# Patient Record
Sex: MALE | Race: OTHER | HISPANIC OR LATINO | URBAN - METROPOLITAN AREA
[De-identification: names, ages, dates, MRNs, and addresses within clinical notes are randomized per-mention and may not be internally consistent; named-entity substitution may affect disease eponyms.]

---

## 2019-03-29 ENCOUNTER — OUTPATIENT (OUTPATIENT)
Dept: OUTPATIENT SERVICES | Facility: HOSPITAL | Age: 64
LOS: 1 days | End: 2019-03-29
Payer: COMMERCIAL

## 2019-03-29 DIAGNOSIS — R01.1 CARDIAC MURMUR, UNSPECIFIED: ICD-10-CM

## 2019-03-29 DIAGNOSIS — I10 ESSENTIAL (PRIMARY) HYPERTENSION: ICD-10-CM

## 2019-03-29 PROBLEM — Z00.00 ENCOUNTER FOR PREVENTIVE HEALTH EXAMINATION: Status: ACTIVE | Noted: 2019-03-29

## 2019-03-29 PROCEDURE — 93306 TTE W/DOPPLER COMPLETE: CPT

## 2019-03-29 PROCEDURE — 93306 TTE W/DOPPLER COMPLETE: CPT | Mod: 26

## 2019-05-17 VITALS
SYSTOLIC BLOOD PRESSURE: 150 MMHG | TEMPERATURE: 98 F | OXYGEN SATURATION: 99 % | HEART RATE: 55 BPM | DIASTOLIC BLOOD PRESSURE: 70 MMHG | RESPIRATION RATE: 16 BRPM

## 2019-05-17 RX ORDER — CHLORHEXIDINE GLUCONATE 213 G/1000ML
1 SOLUTION TOPICAL ONCE
Refills: 0 | Status: DISCONTINUED | OUTPATIENT
Start: 2019-05-20 | End: 2019-05-21

## 2019-05-17 NOTE — H&P ADULT - NSICDXFAMILYHX_GEN_ALL_CORE_FT
No pertinent family history in first degree relatives FAMILY HISTORY:  FH: breast cancer, Sister  FH: CHF (congestive heart failure)  FH: diabetes mellitus, Mother  FH: hypertension, Mother and Father

## 2019-05-17 NOTE — H&P ADULT - RS GEN PE MLT RESP DETAILS PC
breath sounds equal/good air movement/no chest wall tenderness/normal/no rhonchi/airway patent/clear to auscultation bilaterally/no intercostal retractions/no rales/respirations non-labored/no subcutaneous emphysema

## 2019-05-17 NOTE — H&P ADULT - ASSESSMENT
65 yo British/English speaking male, POOR HISTORIAN with PMHx of HTN, HLD, DM, CAD s/p PCI in 2013 in  (told he had multivessel disease) who is referred for cardiac catheterization with possible intervention to r/o underlying CAD 2/2 pt's risk factors, abnormal EST, CCS Class IV Anginal Sx.     H/H . Pt denies bleeding, GI bleeding, hematemesis, hematuria, BRBPR or melena . ASA … and Plavix … pre-cath.  Cr. IV NS@ cc/hr pre-cath.  Risks & benefits of procedure and alternative therapy have been explained to the patient including but not limited to: allergic reaction, bleeding w/possible need for blood transfusion, infection, renal and vascular compromise, limb damage, arrhythmia, stroke, vessel dissection/perforation, Myocardial infarction, emergent CABG. Informed consent obtained and in chart 65 yo British/English speaking male, POOR HISTORIAN with PMHx of HTN, HLD, DM, CAD s/p PCI in 2013 in  (told he had multivessel disease) who is referred for cardiac catheterization with possible intervention to r/o underlying CAD 2/2 pt's risk factors, abnormal EST, CCS Class IV Anginal Sx.     H/H 14.9/45.9. Pt denies bleeding, GI bleeding, hematemesis, hematuria, BRBPR or melena . Pt. reports being compliant with home DAPT. Pt. given ASA 81 mg PO X 1 and Plavix 75 mg PO X 1 pre-cath.  Cr.  pt. euvolemic in exam. IV NS@ 75 cc/hr pre-cath.  Risks & benefits of procedure and alternative therapy have been explained to the patient including but not limited to: allergic reaction, bleeding w/possible need for blood transfusion, infection, renal and vascular compromise, limb damage, arrhythmia, stroke, vessel dissection/perforation, Myocardial infarction, emergent CABG. Informed consent obtained and in chart

## 2019-05-17 NOTE — H&P ADULT - NSHPSOCIALHISTORY_GEN_ALL_CORE
Denies tobacco use/elicit drug use  Used to drink moderately but stopped 15 years ago and now drinks socially

## 2019-05-17 NOTE — H&P ADULT - HISTORY OF PRESENT ILLNESS
**VERIFY MEDICATIONS    63 yo Moldovan/English speaking male, POOR HISTORIAN with PMHx of HTN, HLD, DM, CAD s/p PCI in 2013 in  who presented to Cardiologist Dr. Wright with c/o intermittent, non-radiating RSCP described of "simple pain" and of mild intensity, lasting few seconds before self-resolving since last stent in 2013.  Per MD note: ARMSTRONG and fatigue after climbing 2 flights of stairs but patient reports he is at his baseline exercise tolerance and able to climb 10 flights of stairs without stopping. Pt also endorses L lateral pain between knee and ankle described as discomfort upon ambulation of few city blocks, resolving with rest, over many years.  Denies SOB, dizziness, diaphoresis, palpitations, LE edema, orthopnea, PND, syncope, N/V, abdominal pain. In light of pt's risk factors, abnormal NST, CCS Class IV Anginal Sx, pt referred for cardiac catheterization with possible intervention to r/o underlying CAD. **VERIFY MEDICATIONS    63 yo Mauritian/English speaking male, POOR HISTORIAN with PMHx of HTN, HLD, DM, CAD s/p PCI in 2013 in  (told he had multivessel disease) who presented to Cardiologist Dr. Wright with c/o intermittent, non-radiating RSCP described of "simple pain" and of mild intensity, lasting few seconds before self-resolving since last stent in 2013.  Per MD note: ARMSTRONG and fatigue after climbing 2 flights of stairs but patient reports he is at his baseline exercise tolerance and able to climb 10 flights of stairs without stopping. Pt also endorses L lateral pain between knee and ankle described as discomfort upon ambulation of few city blocks, resolving with rest, over many years.  Denies SOB, dizziness, diaphoresis, palpitations, LE edema, orthopnea, PND, syncope, N/V, abdominal pain, non-healing wounds, change in color/temperature/hair in LE. EST/nuclear perfusion 4/8/19: at peak exercise, pt developed 1 mm horizontal ST segment changes c/w ischemia in inferior leads+ V6, mild anterior wall ischemia LVEF 57% In light of pt's risk factors, abnormal NST, CCS Class IV Anginal Sx, pt referred for cardiac catheterization with possible intervention to r/o underlying CAD. **VERIFY MEDICATIONS    63 yo Grenadian/English speaking male, POOR HISTORIAN with PMHx of HTN, HLD, DM, CAD s/p PCI in 2013 in  (told he had multivessel disease) who presented to Cardiologist Dr. Wright with c/o intermittent, non-radiating RSCP described of "simple pain" and of mild intensity, lasting few seconds before self-resolving since last stent in 2013.  Per MD note: ARMSTRONG and fatigue after climbing 2 flights of stairs but patient reports he is at his baseline exercise tolerance and able to climb 10 flights of stairs without stopping. Pt also endorses L lateral pain between knee and ankle described as discomfort upon ambulation of few city blocks, resolving with rest, over many years.  Denies SOB, dizziness, diaphoresis, palpitations, LE edema, orthopnea, PND, syncope, N/V, abdominal pain, non-healing wounds, change in color/temperature/hair in LE. EST/nuclear perfusion 4/8/19: at peak exercise, pt developed 1 mm horizontal ST segment changes c/w ischemia in inferior leads+ V6, mild anterior wall ischemia LVEF 57% In light of pt's risk factors, abnormal EST, CCS Class IV Anginal Sx, pt referred for cardiac catheterization with possible intervention to r/o underlying CAD. 63 yo Zimbabwean/English speaking male, POOR HISTORIAN with PMHx of HTN, HLD, DM, CAD s/p PCI in 2013 in  (told he had multivessel disease) who presented to Cardiologist Dr. Wright with c/o intermittent, non-radiating RSCP described of "simple pain" and of mild intensity, lasting few seconds before self-resolving since last stent in 2013.  Per MD note: ARMSTRONG and fatigue after climbing 2 flights of stairs but patient reports he is at his baseline exercise tolerance and able to climb 10 flights of stairs without stopping. Pt also endorses L lateral pain between knee and ankle described as discomfort upon ambulation of few city blocks, resolving with rest, over many years.  Denies SOB, dizziness, diaphoresis, palpitations, LE edema, orthopnea, PND, syncope, N/V, abdominal pain, non-healing wounds, change in color/temperature/hair in LE. EST/nuclear perfusion 4/8/19: at peak exercise, pt developed 1 mm horizontal ST segment changes c/w ischemia in inferior leads+ V6, mild anterior wall ischemia LVEF 57% In light of pt's risk factors, abnormal EST, CCS Class IV Anginal Sx, pt referred for cardiac catheterization with possible intervention to r/o underlying CAD.

## 2019-05-17 NOTE — H&P ADULT - NSHPLABSRESULTS_GEN_ALL_CORE
14.9   6.80  )-----------( 180      ( 20 May 2019 07:40 )             45.9   PT/INR - ( 20 May 2019 07:40 )   PT: 11.5 sec;   INR: 1.02          PTT - ( 20 May 2019 07:40 )  PTT:28.3 sec

## 2019-05-17 NOTE — H&P ADULT - NSICDXPASTMEDICALHX_GEN_ALL_CORE_FT
PAST MEDICAL HISTORY:  Coronary artery disease     DM (diabetes mellitus)     Hyperlipidemia     Hypertension

## 2019-05-20 ENCOUNTER — INPATIENT (INPATIENT)
Facility: HOSPITAL | Age: 64
LOS: 0 days | Discharge: HOME | DRG: 246 | End: 2019-05-21
Attending: INTERNAL MEDICINE | Admitting: INTERNAL MEDICINE
Payer: COMMERCIAL

## 2019-05-20 DIAGNOSIS — Z95.5 PRESENCE OF CORONARY ANGIOPLASTY IMPLANT AND GRAFT: Chronic | ICD-10-CM

## 2019-05-20 LAB
ALBUMIN SERPL ELPH-MCNC: 4.4 G/DL — SIGNIFICANT CHANGE UP (ref 3.3–5)
ALP SERPL-CCNC: 60 U/L — SIGNIFICANT CHANGE UP (ref 40–120)
ALT FLD-CCNC: 21 U/L — SIGNIFICANT CHANGE UP (ref 10–45)
ANION GAP SERPL CALC-SCNC: 11 MMOL/L — SIGNIFICANT CHANGE UP (ref 5–17)
APTT BLD: 28.3 SEC — SIGNIFICANT CHANGE UP (ref 27.5–36.3)
AST SERPL-CCNC: 19 U/L — SIGNIFICANT CHANGE UP (ref 10–40)
BASOPHILS # BLD AUTO: 0.05 K/UL — SIGNIFICANT CHANGE UP (ref 0–0.2)
BASOPHILS NFR BLD AUTO: 0.7 % — SIGNIFICANT CHANGE UP (ref 0–2)
BILIRUB SERPL-MCNC: 0.7 MG/DL — SIGNIFICANT CHANGE UP (ref 0.2–1.2)
BUN SERPL-MCNC: 15 MG/DL — SIGNIFICANT CHANGE UP (ref 7–23)
CALCIUM SERPL-MCNC: 9.6 MG/DL — SIGNIFICANT CHANGE UP (ref 8.4–10.5)
CHLORIDE SERPL-SCNC: 97 MMOL/L — SIGNIFICANT CHANGE UP (ref 96–108)
CHOLEST SERPL-MCNC: 128 MG/DL — SIGNIFICANT CHANGE UP (ref 10–199)
CO2 SERPL-SCNC: 29 MMOL/L — SIGNIFICANT CHANGE UP (ref 22–31)
CREAT SERPL-MCNC: 1 MG/DL — SIGNIFICANT CHANGE UP (ref 0.5–1.3)
CRP SERPL-MCNC: 0.08 MG/DL — SIGNIFICANT CHANGE UP (ref 0–0.4)
EOSINOPHIL # BLD AUTO: 0.31 K/UL — SIGNIFICANT CHANGE UP (ref 0–0.5)
EOSINOPHIL NFR BLD AUTO: 4.6 % — SIGNIFICANT CHANGE UP (ref 0–6)
GLUCOSE SERPL-MCNC: 191 MG/DL — HIGH (ref 70–99)
HBA1C BLD-MCNC: 8.9 % — HIGH (ref 4–5.6)
HCT VFR BLD CALC: 45.9 % — SIGNIFICANT CHANGE UP (ref 39–50)
HDLC SERPL-MCNC: 40 MG/DL — SIGNIFICANT CHANGE UP
HGB BLD-MCNC: 14.9 G/DL — SIGNIFICANT CHANGE UP (ref 13–17)
IMM GRANULOCYTES NFR BLD AUTO: 0.3 % — SIGNIFICANT CHANGE UP (ref 0–1.5)
INR BLD: 1.02 — SIGNIFICANT CHANGE UP (ref 0.88–1.16)
LIPID PNL WITH DIRECT LDL SERPL: 65 MG/DL — SIGNIFICANT CHANGE UP
LYMPHOCYTES # BLD AUTO: 3.15 K/UL — SIGNIFICANT CHANGE UP (ref 1–3.3)
LYMPHOCYTES # BLD AUTO: 46.3 % — HIGH (ref 13–44)
MCHC RBC-ENTMCNC: 30 PG — SIGNIFICANT CHANGE UP (ref 27–34)
MCHC RBC-ENTMCNC: 32.5 GM/DL — SIGNIFICANT CHANGE UP (ref 32–36)
MCV RBC AUTO: 92.5 FL — SIGNIFICANT CHANGE UP (ref 80–100)
MONOCYTES # BLD AUTO: 0.58 K/UL — SIGNIFICANT CHANGE UP (ref 0–0.9)
MONOCYTES NFR BLD AUTO: 8.5 % — SIGNIFICANT CHANGE UP (ref 2–14)
NEUTROPHILS # BLD AUTO: 2.69 K/UL — SIGNIFICANT CHANGE UP (ref 1.8–7.4)
NEUTROPHILS NFR BLD AUTO: 39.6 % — LOW (ref 43–77)
NRBC # BLD: 0 /100 WBCS — SIGNIFICANT CHANGE UP (ref 0–0)
PLATELET # BLD AUTO: 180 K/UL — SIGNIFICANT CHANGE UP (ref 150–400)
POTASSIUM SERPL-MCNC: 4.2 MMOL/L — SIGNIFICANT CHANGE UP (ref 3.5–5.3)
POTASSIUM SERPL-SCNC: 4.2 MMOL/L — SIGNIFICANT CHANGE UP (ref 3.5–5.3)
PROT SERPL-MCNC: 7.2 G/DL — SIGNIFICANT CHANGE UP (ref 6–8.3)
PROTHROM AB SERPL-ACNC: 11.5 SEC — SIGNIFICANT CHANGE UP (ref 10–12.9)
RBC # BLD: 4.96 M/UL — SIGNIFICANT CHANGE UP (ref 4.2–5.8)
RBC # FLD: 13.4 % — SIGNIFICANT CHANGE UP (ref 10.3–14.5)
SODIUM SERPL-SCNC: 137 MMOL/L — SIGNIFICANT CHANGE UP (ref 135–145)
TOTAL CHOLESTEROL/HDL RATIO MEASUREMENT: 3.2 RATIO — LOW (ref 3.4–9.6)
TRIGL SERPL-MCNC: 114 MG/DL — SIGNIFICANT CHANGE UP (ref 10–149)
WBC # BLD: 6.8 K/UL — SIGNIFICANT CHANGE UP (ref 3.8–10.5)
WBC # FLD AUTO: 6.8 K/UL — SIGNIFICANT CHANGE UP (ref 3.8–10.5)

## 2019-05-20 PROCEDURE — 93458 L HRT ARTERY/VENTRICLE ANGIO: CPT | Mod: 26,59

## 2019-05-20 PROCEDURE — 93571 IV DOP VEL&/PRESS C FLO 1ST: CPT | Mod: 26

## 2019-05-20 PROCEDURE — 93010 ELECTROCARDIOGRAM REPORT: CPT

## 2019-05-20 PROCEDURE — 92929: CPT | Mod: RC

## 2019-05-20 PROCEDURE — 92928 PRQ TCAT PLMT NTRAC ST 1 LES: CPT | Mod: RC

## 2019-05-20 RX ORDER — HYDROCHLOROTHIAZIDE 25 MG
12.5 TABLET ORAL DAILY
Refills: 0 | Status: DISCONTINUED | OUTPATIENT
Start: 2019-05-21 | End: 2019-05-21

## 2019-05-20 RX ORDER — DEXTROSE 50 % IN WATER 50 %
25 SYRINGE (ML) INTRAVENOUS ONCE
Refills: 0 | Status: DISCONTINUED | OUTPATIENT
Start: 2019-05-20 | End: 2019-05-21

## 2019-05-20 RX ORDER — INSULIN LISPRO 100/ML
VIAL (ML) SUBCUTANEOUS ONCE
Refills: 0 | Status: DISCONTINUED | OUTPATIENT
Start: 2019-05-20 | End: 2019-05-20

## 2019-05-20 RX ORDER — ASPIRIN/CALCIUM CARB/MAGNESIUM 324 MG
81 TABLET ORAL ONCE
Refills: 0 | Status: COMPLETED | OUTPATIENT
Start: 2019-05-20 | End: 2019-05-20

## 2019-05-20 RX ORDER — INSULIN LISPRO 100/ML
VIAL (ML) SUBCUTANEOUS
Refills: 0 | Status: DISCONTINUED | OUTPATIENT
Start: 2019-05-20 | End: 2019-05-21

## 2019-05-20 RX ORDER — METOPROLOL TARTRATE 50 MG
25 TABLET ORAL DAILY
Refills: 0 | Status: DISCONTINUED | OUTPATIENT
Start: 2019-05-20 | End: 2019-05-21

## 2019-05-20 RX ORDER — DEXTROSE 50 % IN WATER 50 %
25 SYRINGE (ML) INTRAVENOUS ONCE
Refills: 0 | Status: DISCONTINUED | OUTPATIENT
Start: 2019-05-20 | End: 2019-05-20

## 2019-05-20 RX ORDER — GLUCAGON INJECTION, SOLUTION 0.5 MG/.1ML
1 INJECTION, SOLUTION SUBCUTANEOUS ONCE
Refills: 0 | Status: DISCONTINUED | OUTPATIENT
Start: 2019-05-20 | End: 2019-05-20

## 2019-05-20 RX ORDER — LISINOPRIL 2.5 MG/1
20 TABLET ORAL DAILY
Refills: 0 | Status: DISCONTINUED | OUTPATIENT
Start: 2019-05-21 | End: 2019-05-21

## 2019-05-20 RX ORDER — AMLODIPINE BESYLATE 2.5 MG/1
5 TABLET ORAL DAILY
Refills: 0 | Status: DISCONTINUED | OUTPATIENT
Start: 2019-05-20 | End: 2019-05-21

## 2019-05-20 RX ORDER — SODIUM CHLORIDE 9 MG/ML
1000 INJECTION, SOLUTION INTRAVENOUS
Refills: 0 | Status: DISCONTINUED | OUTPATIENT
Start: 2019-05-20 | End: 2019-05-21

## 2019-05-20 RX ORDER — DEXTROSE 50 % IN WATER 50 %
15 SYRINGE (ML) INTRAVENOUS ONCE
Refills: 0 | Status: DISCONTINUED | OUTPATIENT
Start: 2019-05-20 | End: 2019-05-20

## 2019-05-20 RX ORDER — GLUCAGON INJECTION, SOLUTION 0.5 MG/.1ML
1 INJECTION, SOLUTION SUBCUTANEOUS ONCE
Refills: 0 | Status: DISCONTINUED | OUTPATIENT
Start: 2019-05-20 | End: 2019-05-21

## 2019-05-20 RX ORDER — SODIUM CHLORIDE 9 MG/ML
500 INJECTION INTRAMUSCULAR; INTRAVENOUS; SUBCUTANEOUS
Refills: 0 | Status: DISCONTINUED | OUTPATIENT
Start: 2019-05-20 | End: 2019-05-21

## 2019-05-20 RX ORDER — SODIUM CHLORIDE 9 MG/ML
1000 INJECTION, SOLUTION INTRAVENOUS
Refills: 0 | Status: DISCONTINUED | OUTPATIENT
Start: 2019-05-20 | End: 2019-05-20

## 2019-05-20 RX ORDER — DEXTROSE 50 % IN WATER 50 %
12.5 SYRINGE (ML) INTRAVENOUS ONCE
Refills: 0 | Status: DISCONTINUED | OUTPATIENT
Start: 2019-05-20 | End: 2019-05-21

## 2019-05-20 RX ORDER — DEXTROSE 50 % IN WATER 50 %
12.5 SYRINGE (ML) INTRAVENOUS ONCE
Refills: 0 | Status: DISCONTINUED | OUTPATIENT
Start: 2019-05-20 | End: 2019-05-20

## 2019-05-20 RX ORDER — SODIUM CHLORIDE 9 MG/ML
500 INJECTION INTRAMUSCULAR; INTRAVENOUS; SUBCUTANEOUS
Refills: 0 | Status: DISCONTINUED | OUTPATIENT
Start: 2019-05-20 | End: 2019-05-20

## 2019-05-20 RX ORDER — DEXTROSE 50 % IN WATER 50 %
15 SYRINGE (ML) INTRAVENOUS ONCE
Refills: 0 | Status: DISCONTINUED | OUTPATIENT
Start: 2019-05-20 | End: 2019-05-21

## 2019-05-20 RX ORDER — CLOPIDOGREL BISULFATE 75 MG/1
75 TABLET, FILM COATED ORAL ONCE
Refills: 0 | Status: COMPLETED | OUTPATIENT
Start: 2019-05-20 | End: 2019-05-20

## 2019-05-20 RX ORDER — ASPIRIN/CALCIUM CARB/MAGNESIUM 324 MG
81 TABLET ORAL DAILY
Refills: 0 | Status: DISCONTINUED | OUTPATIENT
Start: 2019-05-21 | End: 2019-05-21

## 2019-05-20 RX ORDER — CLOPIDOGREL BISULFATE 75 MG/1
75 TABLET, FILM COATED ORAL DAILY
Refills: 0 | Status: DISCONTINUED | OUTPATIENT
Start: 2019-05-21 | End: 2019-05-21

## 2019-05-20 RX ORDER — ATORVASTATIN CALCIUM 80 MG/1
40 TABLET, FILM COATED ORAL AT BEDTIME
Refills: 0 | Status: DISCONTINUED | OUTPATIENT
Start: 2019-05-20 | End: 2019-05-21

## 2019-05-20 RX ADMIN — ATORVASTATIN CALCIUM 40 MILLIGRAM(S): 80 TABLET, FILM COATED ORAL at 22:27

## 2019-05-20 RX ADMIN — Medication 25 MILLIGRAM(S): at 13:40

## 2019-05-20 RX ADMIN — CLOPIDOGREL BISULFATE 75 MILLIGRAM(S): 75 TABLET, FILM COATED ORAL at 08:06

## 2019-05-20 RX ADMIN — Medication 2: at 12:01

## 2019-05-20 RX ADMIN — SODIUM CHLORIDE 75 MILLILITER(S): 9 INJECTION INTRAMUSCULAR; INTRAVENOUS; SUBCUTANEOUS at 08:07

## 2019-05-20 RX ADMIN — AMLODIPINE BESYLATE 5 MILLIGRAM(S): 2.5 TABLET ORAL at 13:40

## 2019-05-20 RX ADMIN — Medication 81 MILLIGRAM(S): at 08:06

## 2019-05-20 NOTE — PATIENT PROFILE ADULT - NSPROHMCARDIOMGMTSTRAT_GEN_A_NUR
adequate rest/routine screening/weight management/diet modification/exercise/medication therapy/fluid modification

## 2019-05-20 NOTE — CHART NOTE - NSCHARTNOTEFT_GEN_A_CORE
Radial Band Removal Note. Radial band removed at 4:30PM  VSS. Pt Asymptomatic.   Pre-Radial Band Removal: R Radial pulse 1+. No hematoma. No bleeding.  Hemostasis achieved with manual release of radial band.  Post Radial Band Removal: R Radial pulse 2+. No hematoma. No bleeding.  Cont. to monitor.  Care instructions given.

## 2019-05-21 ENCOUNTER — TRANSCRIPTION ENCOUNTER (OUTPATIENT)
Age: 64
End: 2019-05-21

## 2019-05-21 VITALS
RESPIRATION RATE: 16 BRPM | HEART RATE: 58 BPM | OXYGEN SATURATION: 98 % | SYSTOLIC BLOOD PRESSURE: 155 MMHG | DIASTOLIC BLOOD PRESSURE: 80 MMHG

## 2019-05-21 LAB
ANION GAP SERPL CALC-SCNC: 14 MMOL/L — SIGNIFICANT CHANGE UP (ref 5–17)
BUN SERPL-MCNC: 13 MG/DL — SIGNIFICANT CHANGE UP (ref 7–23)
CALCIUM SERPL-MCNC: 10 MG/DL — SIGNIFICANT CHANGE UP (ref 8.4–10.5)
CHLORIDE SERPL-SCNC: 100 MMOL/L — SIGNIFICANT CHANGE UP (ref 96–108)
CO2 SERPL-SCNC: 25 MMOL/L — SIGNIFICANT CHANGE UP (ref 22–31)
CREAT SERPL-MCNC: 0.97 MG/DL — SIGNIFICANT CHANGE UP (ref 0.5–1.3)
GLUCOSE SERPL-MCNC: 184 MG/DL — HIGH (ref 70–99)
HBA1C BLD-MCNC: 8.7 % — HIGH (ref 4–5.6)
HCT VFR BLD CALC: 48.8 % — SIGNIFICANT CHANGE UP (ref 39–50)
HCV AB S/CO SERPL IA: 0.04 S/CO — SIGNIFICANT CHANGE UP
HCV AB SERPL-IMP: SIGNIFICANT CHANGE UP
HGB BLD-MCNC: 16.2 G/DL — SIGNIFICANT CHANGE UP (ref 13–17)
MAGNESIUM SERPL-MCNC: 1.6 MG/DL — SIGNIFICANT CHANGE UP (ref 1.6–2.6)
MCHC RBC-ENTMCNC: 30.2 PG — SIGNIFICANT CHANGE UP (ref 27–34)
MCHC RBC-ENTMCNC: 33.2 GM/DL — SIGNIFICANT CHANGE UP (ref 32–36)
MCV RBC AUTO: 90.9 FL — SIGNIFICANT CHANGE UP (ref 80–100)
NRBC # BLD: 0 /100 WBCS — SIGNIFICANT CHANGE UP (ref 0–0)
PLATELET # BLD AUTO: 201 K/UL — SIGNIFICANT CHANGE UP (ref 150–400)
POTASSIUM SERPL-MCNC: 4.1 MMOL/L — SIGNIFICANT CHANGE UP (ref 3.5–5.3)
POTASSIUM SERPL-SCNC: 4.1 MMOL/L — SIGNIFICANT CHANGE UP (ref 3.5–5.3)
RBC # BLD: 5.37 M/UL — SIGNIFICANT CHANGE UP (ref 4.2–5.8)
RBC # FLD: 13 % — SIGNIFICANT CHANGE UP (ref 10.3–14.5)
SODIUM SERPL-SCNC: 139 MMOL/L — SIGNIFICANT CHANGE UP (ref 135–145)
WBC # BLD: 8.67 K/UL — SIGNIFICANT CHANGE UP (ref 3.8–10.5)
WBC # FLD AUTO: 8.67 K/UL — SIGNIFICANT CHANGE UP (ref 3.8–10.5)

## 2019-05-21 PROCEDURE — 99239 HOSP IP/OBS DSCHRG MGMT >30: CPT

## 2019-05-21 PROCEDURE — 99232 SBSQ HOSP IP/OBS MODERATE 35: CPT | Mod: 25

## 2019-05-21 RX ORDER — ASPIRIN/CALCIUM CARB/MAGNESIUM 324 MG
1 TABLET ORAL
Qty: 30 | Refills: 11
Start: 2019-05-21 | End: 2020-05-14

## 2019-05-21 RX ORDER — ASPIRIN/CALCIUM CARB/MAGNESIUM 324 MG
1 TABLET ORAL
Qty: 0 | Refills: 0 | DISCHARGE

## 2019-05-21 RX ORDER — MAGNESIUM SULFATE 500 MG/ML
2 VIAL (ML) INJECTION ONCE
Refills: 0 | Status: COMPLETED | OUTPATIENT
Start: 2019-05-21 | End: 2019-05-21

## 2019-05-21 RX ORDER — AMLODIPINE BESYLATE 2.5 MG/1
5 TABLET ORAL ONCE
Refills: 0 | Status: COMPLETED | OUTPATIENT
Start: 2019-05-21 | End: 2019-05-21

## 2019-05-21 RX ORDER — AMLODIPINE BESYLATE 2.5 MG/1
1 TABLET ORAL
Qty: 0 | Refills: 0 | DISCHARGE

## 2019-05-21 RX ORDER — CLOPIDOGREL BISULFATE 75 MG/1
1 TABLET, FILM COATED ORAL
Qty: 0 | Refills: 0 | DISCHARGE

## 2019-05-21 RX ORDER — CLOPIDOGREL BISULFATE 75 MG/1
1 TABLET, FILM COATED ORAL
Qty: 30 | Refills: 11
Start: 2019-05-21 | End: 2020-05-14

## 2019-05-21 RX ORDER — METFORMIN HYDROCHLORIDE 850 MG/1
1 TABLET ORAL
Qty: 0 | Refills: 0 | DISCHARGE

## 2019-05-21 RX ORDER — AMLODIPINE BESYLATE 2.5 MG/1
1 TABLET ORAL
Qty: 30 | Refills: 0
Start: 2019-05-21 | End: 2019-06-19

## 2019-05-21 RX ORDER — AMLODIPINE BESYLATE 2.5 MG/1
10 TABLET ORAL DAILY
Refills: 0 | Status: DISCONTINUED | OUTPATIENT
Start: 2019-05-22 | End: 2019-05-21

## 2019-05-21 RX ADMIN — Medication 2: at 11:16

## 2019-05-21 RX ADMIN — Medication 25 MILLIGRAM(S): at 05:57

## 2019-05-21 RX ADMIN — Medication 50 GRAM(S): at 10:11

## 2019-05-21 RX ADMIN — Medication 81 MILLIGRAM(S): at 11:16

## 2019-05-21 RX ADMIN — AMLODIPINE BESYLATE 5 MILLIGRAM(S): 2.5 TABLET ORAL at 11:16

## 2019-05-21 RX ADMIN — Medication 2: at 07:13

## 2019-05-21 RX ADMIN — AMLODIPINE BESYLATE 5 MILLIGRAM(S): 2.5 TABLET ORAL at 05:57

## 2019-05-21 RX ADMIN — CLOPIDOGREL BISULFATE 75 MILLIGRAM(S): 75 TABLET, FILM COATED ORAL at 11:16

## 2019-05-21 NOTE — DISCHARGE NOTE NURSING/CASE MANAGEMENT/SOCIAL WORK - NSDCDPATPORTLINK_GEN_ALL_CORE
You can access the [facility] Portal, offered by [facility], by registering with the following website: [email or web address]

## 2019-05-21 NOTE — DISCHARGE NOTE PROVIDER - NSDCCPCAREPLAN_GEN_ALL_CORE_FT
PRINCIPAL DISCHARGE DIAGNOSIS  Diagnosis: CAD (coronary artery disease)  Assessment and Plan of Treatment: CONTINUE ASPIRIN AND PLAVIX (CLOPIDOGREL) DAILY. Continue current listed medical regimen. See Dr. Wright in 1 week. See Primary Doctor in 2 days.  Monitor right wrist/arm and groin/leg for swelling, bleeding, discharge, pain or skin discoloration if any of these findings are noted go to nearest ER, seek medical attention immediately and call [phone] if any questions. If chest pain, shortness of breath, dizziness noted call MD immediately and seek medical attention.  Avoid hot tubs, swimming pools and baths for 1 week. Avoid lifting more than 3 pounds for one week, avoid exertional movements such intimacy, running, jumping, etc for 1 week.  Avoid exertional movements and lifting more than 2 pounds in right/ hand/arm for 1 week.         SECONDARY DISCHARGE DIAGNOSES  Diagnosis: HTN (hypertension)  Assessment and Plan of Treatment: Norvasc (Amlodipine) increased to 10mg daily. Otherwise, continue listed medical regimen. See Dr. Wright in 1 week. See Primary Doctor in 2 days.    Diagnosis: HLD (hyperlipidemia)  Assessment and Plan of Treatment: Continue current listed medical regimen. MD follow-up    Diagnosis: Diabetes  Assessment and Plan of Treatment: HOLD METFORMIN AND RESUME ON 5/23/19. Continue Glimeperide.  Check fingerstick three times daily before meals and at bedtime. If fingerstick greater than 200 or less than 80 call MD for further instructions. Avoid concentrated sweets. Follow diabetic diet. See Primary Doctor in 2 days.    Diagnosis: Hypomagnesemia  Assessment and Plan of Treatment: For low magnesium, given magnesium at [facility]. See Primary Doctor in 2 days to recheck magnesium blood test level PRINCIPAL DISCHARGE DIAGNOSIS  Diagnosis: CAD (coronary artery disease)  Assessment and Plan of Treatment: CONTINUE ASPIRIN AND PLAVIX (CLOPIDOGREL) DAILY. Continue current listed medical regimen. See Dr. Wright in 1 week. See Primary Doctor in 2 days.  Monitor right wrist/arm and groin/leg for swelling, bleeding, discharge, pain or skin discoloration if any of these findings are noted go to nearest ER, seek medical attention immediately and call [phone] if any questions. If chest pain, shortness of breath, dizziness noted call MD immediately and seek medical attention.  Avoid hot tubs, swimming pools and baths for 1 week. Avoid lifting more than 3 pounds for one week, avoid exertional movements such intimacy, running, jumping, etc for 1 week.  Avoid exertional movements and lifting more than 2 pounds in right/ hand/arm for 1 week.   *Return for another angiogram and stent in 5 weeks.*        SECONDARY DISCHARGE DIAGNOSES  Diagnosis: HTN (hypertension)  Assessment and Plan of Treatment: Norvasc (Amlodipine) increased to 10mg daily. Otherwise, continue listed medical regimen. See Dr. Wright in 1 week. See Primary Doctor in 2 days.    Diagnosis: HLD (hyperlipidemia)  Assessment and Plan of Treatment: Continue current listed medical regimen. MD follow-up    Diagnosis: Diabetes  Assessment and Plan of Treatment: HOLD METFORMIN AND RESUME ON 5/23/19. Continue Glimeperide.  Check fingerstick three times daily before meals and at bedtime. If fingerstick greater than 200 or less than 80 call MD for further instructions. Avoid concentrated sweets. Follow diabetic diet. See Primary Doctor in 2 days.    Diagnosis: Hypomagnesemia  Assessment and Plan of Treatment: For low magnesium, given magnesium at [facility]. See Primary Doctor in 2 days to recheck magnesium blood test level

## 2019-05-21 NOTE — DISCHARGE NOTE PROVIDER - CARE PROVIDER_API CALL
Floresita Wright)  Cardiovascular Disease; Internal Medicine  [address] [address]  Phone: [phone]  Fax: [phone]  Follow Up Time:

## 2019-05-21 NOTE — DISCHARGE NOTE PROVIDER - HOSPITAL COURSE
65 y/o Romanian/English speaking M, POOR HISTORIAN w/ PMHX of HTN, HLD, DM, CAD s/p prior PCI w/ CCS Angina Class 4 Sx and abnormal NST.         Pt s/p cardiac angiogram on 5/20/19: LEANNE pRCA, mRCA, dRCA, LEANNE RPLS, residual 90% Ramus and 60% pLAD FFR 0.08. EF normal on Echo        Pt admitted as inpatient as per Dr. Schneider due to pLAD lesion.        Noted to have prior episode of AT on telemetry overnight. Currently in SR.         Hypomagnesemia. Mg 1.6 repleted w/ 2g IV Magnesium.         This AM BP 150s-160s/70s-80s, HR 50s-60s, Pt Asymptomatic, Wrist and Groin Stable, Labs and EKG reviewed.        Pt stable for D/C home as discussed w/ Dr. Alegria on ASA, Plavix, Lipitor 40mg QHS, Toprol XL 25mg daily (BB at max dose due to HR), Lisinopril 20mg daily, and HCTZ 12.5mg daily. Norvasc increased to 10mg daily.         See Dr. Wright in 1 week. See Primary Doctor in 2 days to repeat Magnesium level / BP check.         Pt seen and examined bedside.    Patient denies C/P, SOB, N/V, dizziness, palpitations, and diaphoresis.    Pt denies fever/chills, dysuria, abdominal pain, diarrhea, and cough    	    V/S 	BP:  140s/70s	HR: 50s-60s	 RR: 16	    T: 98	      O2: 96% RA     	    GEN: NAD    PULM:  CTA B/L    CARD: No JVD B/L, RRR, S1 and S2     ABD: +BS, NT, soft/ND	    EXT: No Edema B/L LE, Distal Pulses Intact and at Baseline    R GROIN: soft, no hematoma, no bleeding, no femoral bruit    R WRIST: soft, no hematoma, no bleeding, radial pulse 2+    NEURO: A+Ox3, no focal deficit                                16.2     8.67  )-----------( 201      ( 21 May 2019 06:29 )               48.8         05-21        139  |  100  |  13    ----------------------------<  184<H>    4.1   |  25  |  0.97        Ca    10.0      21 May 2019 06:29    Mg     1.6     05-21        TPro  7.2  /  Alb  4.4  /  TBili  0.7  /  DBili  x   /  AST  19  /  ALT  21  /  AlkPhos  60  05-20 65 y/o Lithuanian/English speaking M, POOR HISTORIAN w/ PMHX of HTN, HLD, DM, CAD s/p prior PCI w/ CCS Angina Class 4 Sx and abnormal NST.         Pt s/p cardiac angiogram on 5/20/19: LEANNE pRCA, mRCA, dRCA, LEANNE RPLS, residual 90% Ramus and 60% pLAD FFR 0.08. EF normal on Echo        Pt admitted as inpatient as per Dr. Schneider due to pLAD lesion.        Noted to have prior episode of AT on telemetry overnight. Currently in SR.         Hypomagnesemia. Mg 1.6 repleted w/ 2g IV Magnesium.         This AM BP 150s-160s/70s-80s, HR 50s-60s, Pt Asymptomatic, Wrist and Groin Stable, Labs and EKG reviewed.        Pt stable for D/C home as discussed w/ Dr. Alegria on ASA, Plavix, Lipitor 40mg QHS, Toprol XL 25mg daily (BB at max dose due to HR), Lisinopril 20mg daily, and HCTZ 12.5mg daily. Norvasc increased to 10mg daily.         See Dr. Wright in 1 week. See Primary Doctor in 2 days to repeat Magnesium level / BP check. Return for another angiogram and stent in 5 weeks.        Pt seen and examined bedside.    Patient denies C/P, SOB, N/V, dizziness, palpitations, and diaphoresis.    Pt denies fever/chills, dysuria, abdominal pain, diarrhea, and cough    	    V/S 	BP:  140s/70s	HR: 50s-60s	 RR: 16	    T: 98	      O2: 96% RA     	    GEN: NAD    PULM:  CTA B/L    CARD: No JVD B/L, RRR, S1 and S2     ABD: +BS, NT, soft/ND	    EXT: No Edema B/L LE, Distal Pulses Intact and at Baseline    R GROIN: soft, no hematoma, no bleeding, no femoral bruit    R WRIST: soft, no hematoma, no bleeding, radial pulse 2+    NEURO: A+Ox3, no focal deficit                                16.2     8.67  )-----------( 201      ( 21 May 2019 06:29 )               48.8         05-21        139  |  100  |  13    ----------------------------<  184<H>    4.1   |  25  |  0.97        Ca    10.0      21 May 2019 06:29    Mg     1.6     05-21        TPro  7.2  /  Alb  4.4  /  TBili  0.7  /  DBili  x   /  AST  19  /  ALT  21  /  AlkPhos  60  05-20

## 2019-05-21 NOTE — PROGRESS NOTE ADULT - SUBJECTIVE AND OBJECTIVE BOX
INTERVENTIONAL CARDIOLOGY FOLLOW UP NOTE    -Patient seen and examined this am    -No events overnight    -No complaints this am    VASCULAR ACCESS EXAM:    FEMORAL:    2+ right/left femoral pulse    2+ DP/PT pulses.    -Right femoral Access site clean, non-tender, without ecchymosis or hematoma.    RADIAL:    2+ right/left radial pulse    Normal capillary refill. No evidence of motor or sensory deficits of digits, hand, wrist or forearm.    -Right radial Access site clean, non-tender, without ecchymosis or hematoma.    A/P: 65 yo Uzbek/English speaking male, POOR HISTORIAN with PMHx of HTN, HLD, DM, CAD s/p PCI in 2013 in [place] now s/p PCI of prox/mid/distal RCA and RPLS with LEANNE via right radial and right femoral approach with no evidence of vascular complications post procedure. FFR of LAD found to be 0.80. Ramus with residual 90% stenosis.    -continue with current medications including dual antiplatelet therapy    -discharge instructions as per protocol    -outpatient follow up with primary cardiologist    - patient to return in 5-6 weeks for PCI of Ramus and LAD

## 2019-05-24 PROBLEM — I25.10 ATHEROSCLEROTIC HEART DISEASE OF NATIVE CORONARY ARTERY WITHOUT ANGINA PECTORIS: Chronic | Status: ACTIVE | Noted: 2019-05-17

## 2019-05-24 PROBLEM — E78.5 HYPERLIPIDEMIA, UNSPECIFIED: Chronic | Status: ACTIVE | Noted: 2019-05-17

## 2019-05-24 PROBLEM — I10 ESSENTIAL (PRIMARY) HYPERTENSION: Chronic | Status: ACTIVE | Noted: 2019-05-17

## 2019-05-24 PROBLEM — E11.9 TYPE 2 DIABETES MELLITUS WITHOUT COMPLICATIONS: Chronic | Status: ACTIVE | Noted: 2019-05-17

## 2019-05-29 DIAGNOSIS — Z95.5 PRESENCE OF CORONARY ANGIOPLASTY IMPLANT AND GRAFT: ICD-10-CM

## 2019-05-29 DIAGNOSIS — E83.42 HYPOMAGNESEMIA: ICD-10-CM

## 2019-05-29 DIAGNOSIS — Z79.02 LONG TERM (CURRENT) USE OF ANTITHROMBOTICS/ANTIPLATELETS: ICD-10-CM

## 2019-05-29 DIAGNOSIS — I25.118 ATHEROSCLEROTIC HEART DISEASE OF NATIVE CORONARY ARTERY WITH OTHER FORMS OF ANGINA PECTORIS: ICD-10-CM

## 2019-05-29 DIAGNOSIS — E11.9 TYPE 2 DIABETES MELLITUS WITHOUT COMPLICATIONS: ICD-10-CM

## 2019-05-29 DIAGNOSIS — Z79.84 LONG TERM (CURRENT) USE OF ORAL HYPOGLYCEMIC DRUGS: ICD-10-CM

## 2019-05-29 DIAGNOSIS — Z83.3 FAMILY HISTORY OF DIABETES MELLITUS: ICD-10-CM

## 2019-05-29 DIAGNOSIS — E78.5 HYPERLIPIDEMIA, UNSPECIFIED: ICD-10-CM

## 2019-05-29 DIAGNOSIS — Z82.49 FAMILY HISTORY OF ISCHEMIC HEART DISEASE AND OTHER DISEASES OF THE CIRCULATORY SYSTEM: ICD-10-CM

## 2019-05-29 DIAGNOSIS — Z79.82 LONG TERM (CURRENT) USE OF ASPIRIN: ICD-10-CM

## 2019-05-29 DIAGNOSIS — I10 ESSENTIAL (PRIMARY) HYPERTENSION: ICD-10-CM

## 2019-06-21 VITALS
RESPIRATION RATE: 18 BRPM | SYSTOLIC BLOOD PRESSURE: 118 MMHG | OXYGEN SATURATION: 99 % | HEIGHT: 67 IN | WEIGHT: 180.34 LBS | HEART RATE: 52 BPM | DIASTOLIC BLOOD PRESSURE: 63 MMHG | TEMPERATURE: 97 F

## 2019-06-21 RX ORDER — CHLORHEXIDINE GLUCONATE 213 G/1000ML
1 SOLUTION TOPICAL ONCE
Refills: 0 | Status: DISCONTINUED | OUTPATIENT
Start: 2019-07-01 | End: 2019-07-01

## 2019-06-21 NOTE — H&P ADULT - HISTORY OF PRESENT ILLNESS
Confirm meds    63 yo [place] Islander/English speaking male, POOR HISTORIAN with PMHx of HTN, HLD, DM, CAD s/p PCI in 2013 in  (told he had multivessel disease) who initially presented to Cardiologist Dr. Wright with c/o intermittent, non-radiating RSCP described of "simple pain" and of mild intensity, lasting few seconds before self-resolving since last stent in 2013.  Per MD note at time patient c/o ARMSTRONG and fatigue after climbing 2 flights of stairs but patient reports he is at his baseline exercise tolerance and able to climb 10 flights of stairs without stopping. He also endorsed L lateral pain between knee and ankle described as discomfort upon ambulation of few city blocks, resolving with rest, over many years. He subsequently underwent EST/nuclear perfusion 4/8/19 which revealed at peak exercise, pt developed 1 mm horizontal ST segment changes c/w ischemia in inferior leads+ V6, mild anterior wall ischemia LVEF 57%. Echo 03/29/2019 revealed EF 64%, minimal AS, mild TR, trace [place]/MR.  He was referred for cath and underwent PTCA/LEANNE mRCA, PTCA/LEANNE pRCA, PTCA/LEANNE dRCA, and PTCA/LEANNE 1st RPL, EF 65%, EDP 13 with residual ramus and pLAD. Since procedure patient states he is feeling -----. He endorses compliance with DAPT therapy. Pt denies SOB, dizziness, diaphoresis, palpitations, LE edema, orthopnea, PND, syncope, N/V, abdominal pain, non-healing wounds, change in color/temperature/hair in LE.     Pt now returns for staged PCI of known lesion of ramus and pLAD Skeleton  Confirm meds    63 yo Upper sorbian/English speaking male, POOR HISTORIAN with PMHx of HTN, HLD, DM, CAD s/p PCI in 2013 in  (told he had multivessel disease) who initially presented to Cardiologist Dr. Wright with c/o intermittent, non-radiating RSCP described of "simple pain" and of mild intensity, lasting few seconds before self-resolving since last stent in 2013.  Per MD note at time patient c/o ARMSTRONG and fatigue after climbing 2 flights of stairs but patient reports he is at his baseline exercise tolerance and able to climb 10 flights of stairs without stopping. He also endorsed L lateral pain between knee and ankle described as discomfort upon ambulation of few city blocks, resolving with rest, over many years. He subsequently underwent EST/nuclear perfusion 4/8/19 which revealed at peak exercise, pt developed 1 mm horizontal ST segment changes c/w ischemia in inferior leads+ V6, mild anterior wall ischemia LVEF 57%. Echo 03/29/2019 revealed EF 64%, minimal AS, mild TR, trace [place]/MR.  He was referred for cath and underwent PTCA/LEANNE mRCA, PTCA/LEANNE pRCA, PTCA/LEANNE dRCA, and PTCA/LEANNE 1st RPL, EF 65%, EDP 13 with residual ramus and pLAD. Since procedure patient states he is feeling -----. He endorses compliance ????. Pt denies SOB, dizziness, diaphoresis, palpitations, LE edema, orthopnea, PND, syncope, N/V, abdominal pain, non-healing wounds, change in color/temperature/hair in LE.     Pt now returns for staged PCI of known lesion of ramus and pLAD the history was take with the help of [employer]  # [id number]  Confirm meds on arrival    63 yo Khmer/English speaking male, POOR HISTORIAN with PMHx of HTN, HLD, DM, CAD s/p PCI in 2013 in [place] (told he had multivessel disease) who initially presented to Cardiologist Dr. Wright with c/o intermittent, non-radiating RSCP described of "simple pain" and of mild intensity, lasting few seconds before self-resolving since last stent in 2013.  Per MD note at time patient c/o ARMSTRONG and fatigue after climbing 2 flights of stairs but patient reports he is at his baseline exercise tolerance and able to climb 10 flights of stairs without stopping. He also endorsed L lateral pain between knee and ankle described as discomfort upon ambulation of few city blocks, resolving with rest, over many years. He subsequently underwent EST/nuclear perfusion 4/8/19 which revealed at peak exercise, pt developed 1 mm horizontal ST segment changes c/w ischemia in inferior leads+ V6, mild anterior wall ischemia LVEF 57%. Echo 03/29/2019 revealed EF 64%, minimal AS, mild TR, trace [place]/MR.  He was referred for cath and underwent PTCA/LEANNE mRCA, PTCA/LEANNE pRCA, PTCA/LEANNE dRCA, and PTCA/LEANNE 1st RPL, EF 65%, EDP 13 with residual ramus and pLAD. Since procedure patient states he is feeling very well, does not endorse any symptoms. He endorses compliance DAPT/ASA/Plavix. Pt denies CP, SOB, dizziness, diaphoresis, palpitations, LE edema, orthopnea, PND, syncope, N/V, abdominal pain, non-healing wounds, change in color/temperature/hair in LE.     Pt now returns for staged PCI of known lesion of ramus and pLAD

## 2019-06-21 NOTE — H&P ADULT - ASSESSMENT
65 yo Barbadian/English speaking male, POOR HISTORIAN with PMHx of HTN, HLD, DM, CAD s/p PCI in 2013 in [place] (told he had multivessel disease) who presented for staged PCI of pLAD.    ASA III and Mallampati III    OF NOTE: pt was loaded with  mg PO x1. Pt took dose of ASA 81 mg PO x1 and Plavix 75 mg POx 1 in am prior to his procedure.    Risks & benefits of procedure and alternative therapy have been explained to the patient including but not limited to: allergic reaction, bleeding w/possible need for blood transfusion, infection, renal and vascular compromise, limb damage, arrhythmia, stroke, vessel dissection/perforation, Myocardial infarction, emergent CABG. Informed consent obtained and in chart. 65 yo Brazilian/English speaking male, POOR HISTORIAN with PMHx of HTN, HLD, DM, CAD s/p PCI in 2013 in [place] (told he had multivessel disease) who presented for staged PCI of pLAD.    ASA III and Mallampati III    OF NOTE: pt was loaded with  mg PO x1. Pt took dose of ASA 81 mg PO x1 and Plavix 75 mg POx 1 in am prior to his procedure.    The procedure was explained and the consent was taken with the help of [employer]  # [id number]    Risks & benefits of procedure and alternative therapy have been explained to the patient including but not limited to: allergic reaction, bleeding w/possible need for blood transfusion, infection, renal and vascular compromise, limb damage, arrhythmia, stroke, vessel dissection/perforation, Myocardial infarction, emergent CABG. Informed consent obtained and in chart.

## 2019-06-21 NOTE — H&P ADULT - NSICDXFAMILYHX_GEN_ALL_CORE_FT
FAMILY HISTORY:  FH: breast cancer, Sister  FH: CHF (congestive heart failure)  FH: diabetes mellitus, Mother  FH: hypertension, Mother and Father

## 2019-07-01 ENCOUNTER — INPATIENT (INPATIENT)
Facility: HOSPITAL | Age: 64
LOS: 0 days | Discharge: HOME | DRG: 247 | End: 2019-07-02
Attending: INTERNAL MEDICINE | Admitting: INTERNAL MEDICINE
Payer: COMMERCIAL

## 2019-07-01 DIAGNOSIS — I10 ESSENTIAL (PRIMARY) HYPERTENSION: ICD-10-CM

## 2019-07-01 DIAGNOSIS — E11.9 TYPE 2 DIABETES MELLITUS WITHOUT COMPLICATIONS: ICD-10-CM

## 2019-07-01 DIAGNOSIS — I25.119 ATHEROSCLEROTIC HEART DISEASE OF NATIVE CORONARY ARTERY WITH UNSPECIFIED ANGINA PECTORIS: ICD-10-CM

## 2019-07-01 DIAGNOSIS — E78.5 HYPERLIPIDEMIA, UNSPECIFIED: ICD-10-CM

## 2019-07-01 DIAGNOSIS — Z95.5 PRESENCE OF CORONARY ANGIOPLASTY IMPLANT AND GRAFT: ICD-10-CM

## 2019-07-01 DIAGNOSIS — I25.84 CORONARY ATHEROSCLEROSIS DUE TO CALCIFIED CORONARY LESION: ICD-10-CM

## 2019-07-01 DIAGNOSIS — Z95.5 PRESENCE OF CORONARY ANGIOPLASTY IMPLANT AND GRAFT: Chronic | ICD-10-CM

## 2019-07-01 DIAGNOSIS — Z79.84 LONG TERM (CURRENT) USE OF ORAL HYPOGLYCEMIC DRUGS: ICD-10-CM

## 2019-07-01 LAB
ALBUMIN SERPL ELPH-MCNC: 4.8 G/DL — SIGNIFICANT CHANGE UP (ref 3.3–5)
ALP SERPL-CCNC: 58 U/L — SIGNIFICANT CHANGE UP (ref 40–120)
ALT FLD-CCNC: 24 U/L — SIGNIFICANT CHANGE UP (ref 10–45)
ANION GAP SERPL CALC-SCNC: 13 MMOL/L — SIGNIFICANT CHANGE UP (ref 5–17)
APTT BLD: 32.1 SEC — SIGNIFICANT CHANGE UP (ref 27.5–36.3)
AST SERPL-CCNC: 21 U/L — SIGNIFICANT CHANGE UP (ref 10–40)
BASOPHILS # BLD AUTO: 0.05 K/UL — SIGNIFICANT CHANGE UP (ref 0–0.2)
BASOPHILS NFR BLD AUTO: 0.7 % — SIGNIFICANT CHANGE UP (ref 0–2)
BILIRUB SERPL-MCNC: 0.4 MG/DL — SIGNIFICANT CHANGE UP (ref 0.2–1.2)
BUN SERPL-MCNC: 17 MG/DL — SIGNIFICANT CHANGE UP (ref 7–23)
CALCIUM SERPL-MCNC: 10.1 MG/DL — SIGNIFICANT CHANGE UP (ref 8.4–10.5)
CHLORIDE SERPL-SCNC: 99 MMOL/L — SIGNIFICANT CHANGE UP (ref 96–108)
CHOLEST SERPL-MCNC: 130 MG/DL — SIGNIFICANT CHANGE UP (ref 10–199)
CK MB CFR SERPL CALC: 1.5 NG/ML — SIGNIFICANT CHANGE UP (ref 0–6.7)
CK SERPL-CCNC: 144 U/L — SIGNIFICANT CHANGE UP (ref 30–200)
CO2 SERPL-SCNC: 27 MMOL/L — SIGNIFICANT CHANGE UP (ref 22–31)
CREAT SERPL-MCNC: 1.04 MG/DL — SIGNIFICANT CHANGE UP (ref 0.5–1.3)
CRP SERPL-MCNC: 0.32 MG/DL — SIGNIFICANT CHANGE UP (ref 0–0.4)
EOSINOPHIL # BLD AUTO: 0.25 K/UL — SIGNIFICANT CHANGE UP (ref 0–0.5)
EOSINOPHIL NFR BLD AUTO: 3.6 % — SIGNIFICANT CHANGE UP (ref 0–6)
GLUCOSE SERPL-MCNC: 178 MG/DL — HIGH (ref 70–99)
HCT VFR BLD CALC: 51 % — HIGH (ref 39–50)
HDLC SERPL-MCNC: 42 MG/DL — SIGNIFICANT CHANGE UP
HGB BLD-MCNC: 16.7 G/DL — SIGNIFICANT CHANGE UP (ref 13–17)
IMM GRANULOCYTES NFR BLD AUTO: 0.1 % — SIGNIFICANT CHANGE UP (ref 0–1.5)
INR BLD: 1.06 — SIGNIFICANT CHANGE UP (ref 0.88–1.16)
LIPID PNL WITH DIRECT LDL SERPL: 67 MG/DL — SIGNIFICANT CHANGE UP
LYMPHOCYTES # BLD AUTO: 2.28 K/UL — SIGNIFICANT CHANGE UP (ref 1–3.3)
LYMPHOCYTES # BLD AUTO: 33.1 % — SIGNIFICANT CHANGE UP (ref 13–44)
MCHC RBC-ENTMCNC: 30.2 PG — SIGNIFICANT CHANGE UP (ref 27–34)
MCHC RBC-ENTMCNC: 32.7 GM/DL — SIGNIFICANT CHANGE UP (ref 32–36)
MCV RBC AUTO: 92.2 FL — SIGNIFICANT CHANGE UP (ref 80–100)
MONOCYTES # BLD AUTO: 0.52 K/UL — SIGNIFICANT CHANGE UP (ref 0–0.9)
MONOCYTES NFR BLD AUTO: 7.5 % — SIGNIFICANT CHANGE UP (ref 2–14)
NEUTROPHILS # BLD AUTO: 3.78 K/UL — SIGNIFICANT CHANGE UP (ref 1.8–7.4)
NEUTROPHILS NFR BLD AUTO: 55 % — SIGNIFICANT CHANGE UP (ref 43–77)
NRBC # BLD: 0 /100 WBCS — SIGNIFICANT CHANGE UP (ref 0–0)
PLATELET # BLD AUTO: 200 K/UL — SIGNIFICANT CHANGE UP (ref 150–400)
POTASSIUM SERPL-MCNC: 4.5 MMOL/L — SIGNIFICANT CHANGE UP (ref 3.5–5.3)
POTASSIUM SERPL-SCNC: 4.5 MMOL/L — SIGNIFICANT CHANGE UP (ref 3.5–5.3)
PROT SERPL-MCNC: 8.3 G/DL — SIGNIFICANT CHANGE UP (ref 6–8.3)
PROTHROM AB SERPL-ACNC: 12 SEC — SIGNIFICANT CHANGE UP (ref 10–12.9)
RBC # BLD: 5.53 M/UL — SIGNIFICANT CHANGE UP (ref 4.2–5.8)
RBC # FLD: 12.5 % — SIGNIFICANT CHANGE UP (ref 10.3–14.5)
SODIUM SERPL-SCNC: 139 MMOL/L — SIGNIFICANT CHANGE UP (ref 135–145)
TOTAL CHOLESTEROL/HDL RATIO MEASUREMENT: 3.1 RATIO — LOW (ref 3.4–9.6)
TRIGL SERPL-MCNC: 105 MG/DL — SIGNIFICANT CHANGE UP (ref 10–149)
WBC # BLD: 6.89 K/UL — SIGNIFICANT CHANGE UP (ref 3.8–10.5)
WBC # FLD AUTO: 6.89 K/UL — SIGNIFICANT CHANGE UP (ref 3.8–10.5)

## 2019-07-01 PROCEDURE — 92928 PRQ TCAT PLMT NTRAC ST 1 LES: CPT | Mod: RI

## 2019-07-01 PROCEDURE — 93010 ELECTROCARDIOGRAM REPORT: CPT | Mod: 76

## 2019-07-01 PROCEDURE — 99222 1ST HOSP IP/OBS MODERATE 55: CPT

## 2019-07-01 RX ORDER — DEXTROSE 50 % IN WATER 50 %
12.5 SYRINGE (ML) INTRAVENOUS ONCE
Refills: 0 | Status: DISCONTINUED | OUTPATIENT
Start: 2019-07-01 | End: 2019-07-02

## 2019-07-01 RX ORDER — SODIUM CHLORIDE 9 MG/ML
1000 INJECTION, SOLUTION INTRAVENOUS
Refills: 0 | Status: DISCONTINUED | OUTPATIENT
Start: 2019-07-01 | End: 2019-07-02

## 2019-07-01 RX ORDER — GLUCAGON INJECTION, SOLUTION 0.5 MG/.1ML
1 INJECTION, SOLUTION SUBCUTANEOUS ONCE
Refills: 0 | Status: DISCONTINUED | OUTPATIENT
Start: 2019-07-01 | End: 2019-07-02

## 2019-07-01 RX ORDER — ASPIRIN/CALCIUM CARB/MAGNESIUM 324 MG
81 TABLET ORAL DAILY
Refills: 0 | Status: DISCONTINUED | OUTPATIENT
Start: 2019-07-02 | End: 2019-07-02

## 2019-07-01 RX ORDER — GLIMEPIRIDE 1 MG
1 TABLET ORAL
Qty: 0 | Refills: 0 | DISCHARGE

## 2019-07-01 RX ORDER — CLOPIDOGREL BISULFATE 75 MG/1
75 TABLET, FILM COATED ORAL DAILY
Refills: 0 | Status: DISCONTINUED | OUTPATIENT
Start: 2019-07-02 | End: 2019-07-02

## 2019-07-01 RX ORDER — SODIUM CHLORIDE 9 MG/ML
500 INJECTION INTRAMUSCULAR; INTRAVENOUS; SUBCUTANEOUS
Refills: 0 | Status: DISCONTINUED | OUTPATIENT
Start: 2019-07-01 | End: 2019-07-02

## 2019-07-01 RX ORDER — ASPIRIN/CALCIUM CARB/MAGNESIUM 324 MG
243 TABLET ORAL ONCE
Refills: 0 | Status: COMPLETED | OUTPATIENT
Start: 2019-07-01 | End: 2019-07-01

## 2019-07-01 RX ORDER — DEXTROSE 50 % IN WATER 50 %
25 SYRINGE (ML) INTRAVENOUS ONCE
Refills: 0 | Status: DISCONTINUED | OUTPATIENT
Start: 2019-07-01 | End: 2019-07-02

## 2019-07-01 RX ORDER — AMLODIPINE BESYLATE 2.5 MG/1
10 TABLET ORAL DAILY
Refills: 0 | Status: DISCONTINUED | OUTPATIENT
Start: 2019-07-02 | End: 2019-07-02

## 2019-07-01 RX ORDER — INSULIN LISPRO 100/ML
VIAL (ML) SUBCUTANEOUS
Refills: 0 | Status: DISCONTINUED | OUTPATIENT
Start: 2019-07-01 | End: 2019-07-02

## 2019-07-01 RX ORDER — DEXTROSE 50 % IN WATER 50 %
15 SYRINGE (ML) INTRAVENOUS ONCE
Refills: 0 | Status: DISCONTINUED | OUTPATIENT
Start: 2019-07-01 | End: 2019-07-02

## 2019-07-01 RX ORDER — METOPROLOL TARTRATE 50 MG
1 TABLET ORAL
Qty: 0 | Refills: 0 | DISCHARGE

## 2019-07-01 RX ORDER — ATORVASTATIN CALCIUM 80 MG/1
80 TABLET, FILM COATED ORAL AT BEDTIME
Refills: 0 | Status: DISCONTINUED | OUTPATIENT
Start: 2019-07-01 | End: 2019-07-02

## 2019-07-01 RX ORDER — INSULIN LISPRO 100/ML
VIAL (ML) SUBCUTANEOUS ONCE
Refills: 0 | Status: DISCONTINUED | OUTPATIENT
Start: 2019-07-01 | End: 2019-07-02

## 2019-07-01 RX ORDER — SODIUM CHLORIDE 9 MG/ML
500 INJECTION INTRAMUSCULAR; INTRAVENOUS; SUBCUTANEOUS
Refills: 0 | Status: DISCONTINUED | OUTPATIENT
Start: 2019-07-01 | End: 2019-07-01

## 2019-07-01 RX ORDER — METFORMIN HYDROCHLORIDE 850 MG/1
1 TABLET ORAL
Qty: 0 | Refills: 0 | DISCHARGE

## 2019-07-01 RX ORDER — METOPROLOL TARTRATE 50 MG
25 TABLET ORAL DAILY
Refills: 0 | Status: DISCONTINUED | OUTPATIENT
Start: 2019-07-01 | End: 2019-07-02

## 2019-07-01 RX ADMIN — Medication 243 MILLIGRAM(S): at 12:56

## 2019-07-01 RX ADMIN — ATORVASTATIN CALCIUM 80 MILLIGRAM(S): 80 TABLET, FILM COATED ORAL at 22:27

## 2019-07-01 RX ADMIN — SODIUM CHLORIDE 100 MILLILITER(S): 9 INJECTION INTRAMUSCULAR; INTRAVENOUS; SUBCUTANEOUS at 18:22

## 2019-07-01 RX ADMIN — SODIUM CHLORIDE 75 MILLILITER(S): 9 INJECTION INTRAMUSCULAR; INTRAVENOUS; SUBCUTANEOUS at 12:57

## 2019-07-01 RX ADMIN — SODIUM CHLORIDE 100 MILLILITER(S): 9 INJECTION INTRAMUSCULAR; INTRAVENOUS; SUBCUTANEOUS at 15:38

## 2019-07-01 NOTE — PATIENT PROFILE ADULT - NSPROHMCARDIOMGMTSTRAT_GEN_A_NUR
medication therapy/fluid modification/adequate rest/weight management/diet modification/exercise/routine screening

## 2019-07-01 NOTE — PATIENT PROFILE ADULT - FALL HARM RISK
coagulation(Bleeding disorder R/T clinical cond/anti-coags)/S/P staged PCI, stents placed/other/surgery

## 2019-07-02 ENCOUNTER — TRANSCRIPTION ENCOUNTER (OUTPATIENT)
Age: 64
End: 2019-07-02

## 2019-07-02 VITALS — TEMPERATURE: 98 F

## 2019-07-02 LAB
ANION GAP SERPL CALC-SCNC: 13 MMOL/L — SIGNIFICANT CHANGE UP (ref 5–17)
BASOPHILS # BLD AUTO: 0.05 K/UL — SIGNIFICANT CHANGE UP (ref 0–0.2)
BASOPHILS NFR BLD AUTO: 0.7 % — SIGNIFICANT CHANGE UP (ref 0–2)
BUN SERPL-MCNC: 14 MG/DL — SIGNIFICANT CHANGE UP (ref 7–23)
CALCIUM SERPL-MCNC: 9.2 MG/DL — SIGNIFICANT CHANGE UP (ref 8.4–10.5)
CHLORIDE SERPL-SCNC: 99 MMOL/L — SIGNIFICANT CHANGE UP (ref 96–108)
CO2 SERPL-SCNC: 25 MMOL/L — SIGNIFICANT CHANGE UP (ref 22–31)
CREAT SERPL-MCNC: 0.93 MG/DL — SIGNIFICANT CHANGE UP (ref 0.5–1.3)
EOSINOPHIL # BLD AUTO: 0.23 K/UL — SIGNIFICANT CHANGE UP (ref 0–0.5)
EOSINOPHIL NFR BLD AUTO: 3.1 % — SIGNIFICANT CHANGE UP (ref 0–6)
GLUCOSE SERPL-MCNC: 120 MG/DL — HIGH (ref 70–99)
HCT VFR BLD CALC: 46.9 % — SIGNIFICANT CHANGE UP (ref 39–50)
HGB BLD-MCNC: 15.1 G/DL — SIGNIFICANT CHANGE UP (ref 13–17)
IMM GRANULOCYTES NFR BLD AUTO: 0.3 % — SIGNIFICANT CHANGE UP (ref 0–1.5)
LYMPHOCYTES # BLD AUTO: 2.4 K/UL — SIGNIFICANT CHANGE UP (ref 1–3.3)
LYMPHOCYTES # BLD AUTO: 32.3 % — SIGNIFICANT CHANGE UP (ref 13–44)
MAGNESIUM SERPL-MCNC: 1.8 MG/DL — SIGNIFICANT CHANGE UP (ref 1.6–2.6)
MCHC RBC-ENTMCNC: 29.5 PG — SIGNIFICANT CHANGE UP (ref 27–34)
MCHC RBC-ENTMCNC: 32.2 GM/DL — SIGNIFICANT CHANGE UP (ref 32–36)
MCV RBC AUTO: 91.8 FL — SIGNIFICANT CHANGE UP (ref 80–100)
MONOCYTES # BLD AUTO: 0.58 K/UL — SIGNIFICANT CHANGE UP (ref 0–0.9)
MONOCYTES NFR BLD AUTO: 7.8 % — SIGNIFICANT CHANGE UP (ref 2–14)
NEUTROPHILS # BLD AUTO: 4.15 K/UL — SIGNIFICANT CHANGE UP (ref 1.8–7.4)
NEUTROPHILS NFR BLD AUTO: 55.8 % — SIGNIFICANT CHANGE UP (ref 43–77)
NRBC # BLD: 0 /100 WBCS — SIGNIFICANT CHANGE UP (ref 0–0)
PLATELET # BLD AUTO: 216 K/UL — SIGNIFICANT CHANGE UP (ref 150–400)
POTASSIUM SERPL-MCNC: 3.8 MMOL/L — SIGNIFICANT CHANGE UP (ref 3.5–5.3)
POTASSIUM SERPL-SCNC: 3.8 MMOL/L — SIGNIFICANT CHANGE UP (ref 3.5–5.3)
RBC # BLD: 5.11 M/UL — SIGNIFICANT CHANGE UP (ref 4.2–5.8)
RBC # FLD: 12.5 % — SIGNIFICANT CHANGE UP (ref 10.3–14.5)
SODIUM SERPL-SCNC: 137 MMOL/L — SIGNIFICANT CHANGE UP (ref 135–145)
WBC # BLD: 7.43 K/UL — SIGNIFICANT CHANGE UP (ref 3.8–10.5)
WBC # FLD AUTO: 7.43 K/UL — SIGNIFICANT CHANGE UP (ref 3.8–10.5)

## 2019-07-02 PROCEDURE — 99238 HOSP IP/OBS DSCHRG MGMT 30/<: CPT

## 2019-07-02 PROCEDURE — 93010 ELECTROCARDIOGRAM REPORT: CPT

## 2019-07-02 RX ORDER — ATORVASTATIN CALCIUM 80 MG/1
1 TABLET, FILM COATED ORAL
Qty: 0 | Refills: 0 | DISCHARGE

## 2019-07-02 RX ORDER — ATORVASTATIN CALCIUM 80 MG/1
1 TABLET, FILM COATED ORAL
Qty: 30 | Refills: 2
Start: 2019-07-02 | End: 2019-09-29

## 2019-07-02 RX ORDER — CHLORTHALIDONE 50 MG
1 TABLET ORAL
Qty: 0 | Refills: 0 | DISCHARGE

## 2019-07-02 RX ORDER — MAGNESIUM OXIDE 400 MG ORAL TABLET 241.3 MG
400 TABLET ORAL ONCE
Refills: 0 | Status: COMPLETED | OUTPATIENT
Start: 2019-07-02 | End: 2019-07-02

## 2019-07-02 RX ORDER — POTASSIUM CHLORIDE 20 MEQ
20 PACKET (EA) ORAL ONCE
Refills: 0 | Status: COMPLETED | OUTPATIENT
Start: 2019-07-02 | End: 2019-07-02

## 2019-07-02 RX ADMIN — Medication 20 MILLIEQUIVALENT(S): at 11:50

## 2019-07-02 RX ADMIN — Medication 1: at 11:51

## 2019-07-02 RX ADMIN — CLOPIDOGREL BISULFATE 75 MILLIGRAM(S): 75 TABLET, FILM COATED ORAL at 11:50

## 2019-07-02 RX ADMIN — Medication 25 MILLIGRAM(S): at 06:06

## 2019-07-02 RX ADMIN — AMLODIPINE BESYLATE 10 MILLIGRAM(S): 2.5 TABLET ORAL at 06:06

## 2019-07-02 RX ADMIN — MAGNESIUM OXIDE 400 MG ORAL TABLET 400 MILLIGRAM(S): 241.3 TABLET ORAL at 11:51

## 2019-07-02 RX ADMIN — Medication 81 MILLIGRAM(S): at 11:50

## 2019-07-02 NOTE — DISCHARGE NOTE PROVIDER - HOSPITAL COURSE
65 yo [place]/English speaking male, POOR HISTORIAN with PMHx of HTN, HLD, DM, CAD s/p PCI in 2013 in  (told he had multivessel disease) who initially presented to Cardiologist Dr. Wright with c/o intermittent, non-radiating RSCP described of "simple pain" and of mild intensity, lasting few seconds before self-resolving since last stent in 2013.  Per MD note at time patient c/o ARMSTRONG and fatigue after climbing 2 flights of stairs but patient reports he is at his baseline exercise tolerance and able to climb 10 flights of stairs without stopping. He also endorsed L lateral pain between knee and ankle described as discomfort upon ambulation of few city blocks, resolving with rest, over many years. He subsequently underwent EST/nuclear perfusion 4/8/19 which revealed at peak exercise, pt developed 1 mm horizontal ST segment changes c/w ischemia in inferior leads+ V6, mild anterior wall ischemia LVEF 57%. Echo 03/29/2019 revealed EF 64%, minimal AS, mild TR, trace CA/MR.  He was referred for cath and underwent PTCA/LEANNE mRCA, PTCA/LEANNE pRCA, PTCA/LEANNE dRCA, and PTCA/LEANNE 1st RPL, EF 65%, EDP 13 with residual ramus and pLAD. Since procedure patient states he is feeling very well, does not endorse any symptoms. He endorses compliance DAPT/ASA/Plavix. Pt denies CP, SOB, dizziness, diaphoresis, palpitations, LE edema, orthopnea, PND, syncope, N/V, abdominal pain, non-healing wounds, change in color/temperature/hair in LE.         Pt now returns for staged PCI of known lesion of ramus and pLAD         s/p staged PCI on 7/1/19: s/p LEANNE pLAD (70%) and LEANNE Ramus (95%)    Widely patent LCx and RCA stents        R Groin PC    EF 64% by Echo in 03/2019 63 yo [place]/English speaking male, POOR HISTORIAN with PMHx of HTN, HLD, DM, CAD s/p PCI in 2013 in [place] (told he had multivessel disease) and most recent cath receiving a PTCA/LEANNE mRCA, PTCA/LEANNE pRCA, PTCA/LEANNE dRCA, and PTCA/LEANNE 1st RPL, EF 65%, with residual ramus of pLAD and presents to [facility] for staged PCI.  He is now s/p cardiac Cath 7/1/19 receiving a  LEANNE to pLAD (70%) and LEANNE Ramus (95%), with Widely patent LCx and RCA stents.  Right groin site perclose and site stable with no bleeding, hematoma, and pulses intact.  Patient feeling well.  Labs, vitals, Meds reviewed with Dr. Wright and patient deemed stable for discharge home.  Lipitor 40 increased to 80mg daily.  Patient has refills of all medications as home.  He received Rx for lipitor 80mg.  He will follow up with Dr. Wright.

## 2019-07-02 NOTE — DISCHARGE NOTE PROVIDER - NSDCCPCAREPLAN_GEN_ALL_CORE_FT
PRINCIPAL DISCHARGE DIAGNOSIS  Diagnosis: Coronary artery disease  Assessment and Plan of Treatment: - Please follow up nara Wright on Wednesday July 10th.  Call the office to schedule a time  - You underwent a cardiac catheterization and received a two stents to your heart arteries.  Your prior stents were open  - Please continue to take aspirin 81mg daily and Plavix 75mg daily.  DO NOT STOP THESE MEDICATIONS AS THEY PREVENT YOUR STENT FROM CLOSING  You underwent a coronary angiogram and should wait 3 days before returning to ordinary activities. Do not drive for 2 days. Consult your doctor before returning to vigorous activity. You may return to work in 3-5 days. The catheter from your groin was removed and you should remove the dressing in 24 hours. You may shower once the dressing is removed, but avoid baths, hot tubs, or swimming for 5 days to prevent infection. If you notice bleeding from the site, hardening and pain at the site, drainage or redness from the site, coolness/paleness of the extremity, swelling, or fever, please call [phone].      SECONDARY DISCHARGE DIAGNOSES  Diagnosis: High blood pressure  Assessment and Plan of Treatment: Your blood pressure is controlled  - Please STOP taking Chlorthalidone becuase it is too similar to your other blood pressure medications  - Continue taking norvasc and HCTZ/Lisinopril combination as usual.    Diagnosis: Elevated cholesterol  Assessment and Plan of Treatment: YOur cholesterol is controlled but due to your coronary artery disease please increase your atorvastatin from 40mg to 80mg  - a new prescription was sent to the pharmacy    Diagnosis: Diabetes  Assessment and Plan of Treatment: YOur diabetes is not controlled.  Your HGB a11c is 8.7.  Your goal is less then 7.    - please hold your metformin for two days becuase of contrast dye and restart on Thursday july 4th  - Continue your other diabetes medication as usual.

## 2019-07-02 NOTE — PROGRESS NOTE ADULT - SUBJECTIVE AND OBJECTIVE BOX
INTERVENTIONAL CARDIOLOGY FOLLOW UP NOTE    -Patient seen and examined this am  -No events overnight  -No complaints this am  -femoral site no TTP, no pain and no hematoma at site    T(C): 36.5 (07-02-19 @ 06:19), Max: 36.7 (07-01-19 @ 18:41)  HR: 52 (07-02-19 @ 05:20) (48 - 58)  BP: 131/87 (07-02-19 @ 05:20) (111/56 - 135/75)  RR: 18 (07-02-19 @ 05:20) (16 - 18)  SpO2: 96% (07-02-19 @ 05:20) (95% - 97%)    VASCULAR ACCESS EXAM:  FEMORAL:  2+ right/left femoral pulse  2+ DP/PT pulses.  -Access site clean, non-tender, without ecchymosis or hematoma.      A/P  S/p PCI via femoral approach with no evidence of vascular complications post procedure.  -continue with current medications including dual antiplatelet therapy  -discharge instructions as per protocol  -outpatient follow up with primary cardiologist INTERVENTIONAL CARDIOLOGY FOLLOW UP NOTE    -Patient seen and examined this am  -No events overnight  -No complaints this am  -femoral site no TTP, no pain and no hematoma at site    T(C): 36.5 (07-02-19 @ 06:19), Max: 36.7 (07-01-19 @ 18:41)  HR: 52 (07-02-19 @ 05:20) (48 - 58)  BP: 131/87 (07-02-19 @ 05:20) (111/56 - 135/75)  RR: 18 (07-02-19 @ 05:20) (16 - 18)  SpO2: 96% (07-02-19 @ 05:20) (95% - 97%)    VASCULAR ACCESS EXAM:  FEMORAL:  2+ right/left femoral pulse  2+ DP/PT pulses.  -Access site clean, non-tender, without ecchymosis or hematoma.      A/P  S/p PCI via femoral approach with no evidence of vascular complications post procedure.  -continue with current medications including dual antiplatelet therapy of ASA and plavix for duration 12 months, followed by ASA 81 mg lifelong  -discharge instructions as per protocol  -outpatient follow up with primary cardiologist

## 2019-07-02 NOTE — DISCHARGE NOTE NURSING/CASE MANAGEMENT/SOCIAL WORK - NSDCDPATPORTLINK_GEN_ALL_CORE
You can access the [facility] Portal, offered by [facility], by registering with the following website: [email or web address]

## 2019-07-02 NOTE — DISCHARGE NOTE PROVIDER - CARE PROVIDER_API CALL
Floresita Wright)  Cardiovascular Disease; Internal Medicine  [address] [address]  Phone: [phone]  Fax: [phone]  Follow Up Time:

## 2019-07-02 NOTE — DISCHARGE NOTE NURSING/CASE MANAGEMENT/SOCIAL WORK - NSTRANSFEREYEGLASSESPAIRS_GEN_A_NUR
H&P reviewed  After examining the patient I find no changes in the patients condition since the H&P had been written 
1 pair

## 2019-07-17 PROCEDURE — 85610 PROTHROMBIN TIME: CPT

## 2019-07-17 PROCEDURE — 86140 C-REACTIVE PROTEIN: CPT

## 2019-07-17 PROCEDURE — C1887: CPT

## 2019-07-17 PROCEDURE — 80048 BASIC METABOLIC PNL TOTAL CA: CPT

## 2019-07-17 PROCEDURE — 82553 CREATINE MB FRACTION: CPT

## 2019-07-17 PROCEDURE — C1894: CPT

## 2019-07-17 PROCEDURE — 80061 LIPID PANEL: CPT

## 2019-07-17 PROCEDURE — 83735 ASSAY OF MAGNESIUM: CPT

## 2019-07-17 PROCEDURE — 36415 COLL VENOUS BLD VENIPUNCTURE: CPT

## 2019-07-17 PROCEDURE — 80053 COMPREHEN METABOLIC PANEL: CPT

## 2019-07-17 PROCEDURE — 85025 COMPLETE CBC W/AUTO DIFF WBC: CPT

## 2019-07-17 PROCEDURE — 93005 ELECTROCARDIOGRAM TRACING: CPT

## 2019-07-17 PROCEDURE — C1769: CPT

## 2019-07-17 PROCEDURE — C1760: CPT

## 2019-07-17 PROCEDURE — C1725: CPT

## 2019-07-17 PROCEDURE — 82550 ASSAY OF CK (CPK): CPT

## 2019-07-17 PROCEDURE — 85730 THROMBOPLASTIN TIME PARTIAL: CPT

## 2019-07-17 PROCEDURE — C1874: CPT

## 2019-07-17 PROCEDURE — 82962 GLUCOSE BLOOD TEST: CPT

## 2019-10-31 PROCEDURE — C1760: CPT

## 2019-10-31 PROCEDURE — 83735 ASSAY OF MAGNESIUM: CPT

## 2019-10-31 PROCEDURE — 93005 ELECTROCARDIOGRAM TRACING: CPT

## 2019-10-31 PROCEDURE — 80061 LIPID PANEL: CPT

## 2019-10-31 PROCEDURE — 85610 PROTHROMBIN TIME: CPT

## 2019-10-31 PROCEDURE — 80048 BASIC METABOLIC PNL TOTAL CA: CPT

## 2019-10-31 PROCEDURE — 86140 C-REACTIVE PROTEIN: CPT

## 2019-10-31 PROCEDURE — C1894: CPT

## 2019-10-31 PROCEDURE — 86803 HEPATITIS C AB TEST: CPT

## 2019-10-31 PROCEDURE — 80053 COMPREHEN METABOLIC PANEL: CPT

## 2019-10-31 PROCEDURE — C1889: CPT

## 2019-10-31 PROCEDURE — 82553 CREATINE MB FRACTION: CPT

## 2019-10-31 PROCEDURE — 85025 COMPLETE CBC W/AUTO DIFF WBC: CPT

## 2019-10-31 PROCEDURE — C1725: CPT

## 2019-10-31 PROCEDURE — 82550 ASSAY OF CK (CPK): CPT

## 2019-10-31 PROCEDURE — C1769: CPT

## 2019-10-31 PROCEDURE — 85027 COMPLETE CBC AUTOMATED: CPT

## 2019-10-31 PROCEDURE — 85730 THROMBOPLASTIN TIME PARTIAL: CPT

## 2019-10-31 PROCEDURE — 83036 HEMOGLOBIN GLYCOSYLATED A1C: CPT

## 2019-10-31 PROCEDURE — 36415 COLL VENOUS BLD VENIPUNCTURE: CPT

## 2019-10-31 PROCEDURE — C1874: CPT

## 2019-10-31 PROCEDURE — 82962 GLUCOSE BLOOD TEST: CPT

## 2019-10-31 PROCEDURE — C1887: CPT

## 2020-12-01 NOTE — PATIENT PROFILE ADULT - DO YOU FEEL UNSAFE AT HOME, WORK, OR SCHOOL?
no Arava Counseling:  Patient counseled regarding adverse effects of Arava including but not limited to nausea, vomiting, abnormalities in liver function tests. Patients may develop mouth sores, rash, diarrhea, and abnormalities in blood counts. The patient understands that monitoring is required including LFTs and blood counts.  There is a rare possibility of scarring of the liver and lung problems that can occur when taking methotrexate. Persistent nausea, loss of appetite, pale stools, dark urine, cough, and shortness of breath should be reported immediately. Patient advised to discontinue Arava treatment and consult with a physician prior to attempting conception. The patient will have to undergo a treatment to eliminate Arava from the body prior to conception.

## 2021-02-02 ENCOUNTER — APPOINTMENT (OUTPATIENT)
Dept: HEART AND VASCULAR | Facility: CLINIC | Age: 66
End: 2021-02-02